# Patient Record
Sex: FEMALE | Race: WHITE
[De-identification: names, ages, dates, MRNs, and addresses within clinical notes are randomized per-mention and may not be internally consistent; named-entity substitution may affect disease eponyms.]

---

## 2018-05-24 ENCOUNTER — HOSPITAL ENCOUNTER (OUTPATIENT)
Dept: HOSPITAL 80 - FSGY | Age: 53
Discharge: HOME | End: 2018-05-24
Attending: OBSTETRICS & GYNECOLOGY
Payer: COMMERCIAL

## 2018-05-24 VITALS — SYSTOLIC BLOOD PRESSURE: 110 MMHG | DIASTOLIC BLOOD PRESSURE: 61 MMHG

## 2018-05-24 DIAGNOSIS — D25.0: ICD-10-CM

## 2018-05-24 DIAGNOSIS — N93.8: Primary | ICD-10-CM

## 2018-05-24 PROCEDURE — 0UB94ZZ EXCISION OF UTERUS, PERCUTANEOUS ENDOSCOPIC APPROACH: ICD-10-PCS | Performed by: OBSTETRICS & GYNECOLOGY

## 2018-05-24 PROCEDURE — 58545 LAPAROSCOPIC MYOMECTOMY: CPT

## 2018-05-24 PROCEDURE — C1782 MORCELLATOR: HCPCS

## 2018-05-24 NOTE — GOP
[f rep st]



                                                                OPERATIVE REPORT





DATE OF OPERATION:  05/24/2018



SURGEON:  Livier Andersen MD



ANESTHESIA:  General with LMA.



ANESTHESIOLOGIST:  Thiago Dc MD.



PREOPERATIVE DIAGNOSIS:  

1.  Dysfunctional uterine bleeding.  

2.  Symptomatic submucosal fibroids.



POSTOPERATIVE DIAGNOSIS:  

1.  Dysfunctional uterine bleeding.  

2.  Symptomatic submucosal fibroids.

3.  Submucosal fibroid, quite large.



PROCEDURE PERFORMED:  Extensive hysteroscopic myomectomy including at least 40 minutes of morcellatio
n of uterine fibroids.



FINDINGS:  2 very large uterine fibroids which originated from the posterior uterine wall, normal tub
al ostia, normal cervix.





ESTIMATED BLOOD LOSS:  Minimal.



INDICATIONS:  Patient is a 53-year-old who has had problematic menstrual cycles including very heavy 
cycles that have caused some anemia.  Was noted initially to have submucosal fibroids on ultrasound i
n 2014, measuring about 2-3 cm.  She was offered surgical treatment at that time and declined and the
n presented again in January 2018, with continued very heavy menstrual cycles.  Repeat ultrasound anika
wed that she had 2 distinct fibroids, one measuring 3.3 cm and a second one measuring 2 cm.  Desires 
definitive treatment at this time.



DESCRIPTION OF PROCEDURE:  With informed consent signed, patient was taken to the operating room, [facility] under general anesthesia, placed in the low dorsal lithotomy position, prepped and draped in the 
usual sterile fashion.  Cervix dilated first to 6 mm and the smaller hysteroscope placed.  Because th
e fibroids were so enlarged, I decided to switch to the 9 mm scope, so this was switched out.  The ce
rvix was dilated to 9 mm, and a 9 mm hysteroscope placed.  Normal saline as a filling medium.  Findin
gs as noted above.  Morcellator blade was placed into the hysteroscope, and resection of the fibroid 
was done.  It was a very extensive resection as the fibroids were so large that the pieces would clog
 the morcellator blade and visibility was often obscured by bleeding and fibroid pieces in the uterin
e cavity, but after about 40 minutes of morcellation, it was felt that about 90% of the fibroids were
 removed.  Hemostasis was noted.  Net fluid deficit was about 500 cc.  Procedure terminated.  Patient
 placed in supine position, awakened in the operating room, taken to recovery room in stable conditio
n, tolerated procedure well.





Job #:  [id number]

## 2018-05-24 NOTE — PDANEPAE
ANE History of Present Illness





hysteroscopy morcellation





ANE Past Medical History





- Cardiovascular History


Hx Hypertension: No


Hx Arrhythmias: No


Hx Chest Pain: No


Hx Coronary Artery / Peripheral Vascular Disease: No


Hx CHF / Valvular Disease: No


Hx Palpitations: No





- Pulmonary History


Hx COPD: No


Hx Asthma/Reactive Airway Disease: No


Hx Recent Upper Respiratory Infection: No


Hx Oxygen in Use at Home: No


Hx Sleep Apnea: No


Sleep Apnea Screening Result - Last Documented: Negative





- Neurologic History


Hx Cerebrovascular Accident: No


Hx Seizures: No


Hx Dementia: No





- Endocrine History


Hx Diabetes: No





- Renal History


Hx Renal Disorders: No





- Liver History


Hx Hepatic Disorders: No





- Neurological & Psychiatric Hx


Hx Neurological and Psychiatric Disorders: No


Neurological / Psychiatric History Comment: INSOMNIA





- Cancer History


Hx Cancer: No





- Congenital Disorder History


Hx Congenital Disorders: No





- GI History


Hx Gastrointestinal Disorders: No





- Other Health History


Other Health History: ANEMIA IN PAST





- Chronic Pain History


Chronic Pain: No





- Surgical History


Prior Surgeries: TONSILLECTOMY





ANE Review of Systems


Review of Systems: 








- Exercise capacity


METS (RN): 5 METS





ANE Patient History





- Allergies


Allergies/Adverse Reactions: 








codeine Allergy (Verified 05/24/18 07:40)


 MAJOR HALLUCINATIONS


latex Allergy (Verified 05/24/18 07:40)


 Itching








- Home Medications


Home medications: home medication list seen and reviewed


Home Medications: 








Herbals/Supplements -Info Only  03/21/18 [Last Taken 05/17/18]








- NPO status


NPO Status: no food or drink >8 hours


NPO Since - Liquids (Date): 05/23/18


NPO Since - Liquids (Time): 23:00


NPO Since - Solids (Date): 05/23/18


NPO Since - Solids (Time): 22:00





- Anes Hx


Anes Hx: no prior problems





- Smoking Hx


Smoking Status: Never smoked





- Family Anes Hx


Family Anes Hx: none


Family Hx Anesthesia Complications: NEG





ANE Labs/Vital Signs





- Vital Signs


Vital Signs: reviewed preoperatively; see RN documention for details


Blood Pressure: 124/69


Heart Rate: 66


Respiratory Rate: 16


O2 Sat (%): 98


Height: 172.72 cm


Weight: 58.967 kg





ANE Physical Exam





- Airway


Neck exam: FROM


Mallampati Score: Class 1


Mouth exam: poor dentition





- Pulmonary


Pulmonary: no respiratory distress





- Cardiovascular


Cardiovascular: regular rate and rhythym





- ASA Status


ASA Status: I





ANE Anesthesia Plan


Anesthesia Plan: GA w LMA

## 2018-05-24 NOTE — POSTOPPROG
Post Op Note


Date of Operation: 05/24/18


Surgeon: Livier Andersen


Anesthesiologist: Thiago Dc


Anesthesia: LMA


Pre-op Diagnosis: DUB submucosal fibroid


Post-op Diagnosis:  very large submucosal fibroid


Indication: DUB fibroid


Procedure: extensive H/S myomectomy


Findings:  very large submucosal fibroid


Inf/Abcess present in the surg proc area at time of surgery?: No


EBL: Minimal